# Patient Record
(demographics unavailable — no encounter records)

---

## 2024-11-14 NOTE — IMAGING
[de-identified] : Constitutional: well developed and well nourished, able to communicate Cardiovascular: Peripheral vascular exam is grossly normal Neurologic: Alert and oriented, no acute distress. Skin: normal skin with no ulcers, rashes, or lesions Pulmonary: No respiratory distress, breathing comfortably on room air Lymphatics: No obvious lymphadenopathy or lymphedema in areas examined  BILATERAL KNEE EXAM Alignment: varus Effusion: None Atrophy: None                                                  Stable to Varus/valgus stress Posterior Drawer Test: negative Anterior Drawer Test: Negative Knee Extension/Flexion: 0 / 100  Medial/lateral compartments Medial joint line: POS Tenderness Lateral joint line: No Tenderness [place] test: negative  Patellofemoral joint Medial patellar facet: no tenderness Patellar grind: Negative  Tendons: Pes Anserine: No tenderness Gerdys Tubercle/ IT Band: No tenderness Quadriceps Tendon: No Tenderness patellar tendon: no Tenderness Tibial tubercle: not tenderness Calf: no Tenderness  Neurovascular exam Muscle strength: 5/5 Sensation to light touch: intact Distal pulses: 2+  IMAGING: 10/03/2024 Xrays of the Right Knee were taken demonstrating tricompartmental arthritis with joint space collapse, osteophytes, and sclerosis (NW)

## 2024-11-14 NOTE — WORK
[Sprain/Strain] : sprain/strain [Was the competent medical cause of the injury] : was the competent medical cause of the injury [Total (100%)] : total (100%) [Can return to work without limitations on ______] : can return to work without limitations on [unfilled]

## 2024-11-14 NOTE — HISTORY OF PRESENT ILLNESS
[8] : 8 [5] : 5 [Dull/Aching] : dull/aching [Constant] : constant [de-identified] : 10/3/24 pt is here for RUPA knee pain, referred by , pt uses walker for support, pt had a fall 8/2/24 on her RT knee. 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 Had prior arthroscopic 10-15 years.  she had pain before the injury in the knee. Had prior CSI of the right knee 7-8 years ago. Otherwise has been functioning well with a cane prior the injury. + motrin before and after the injury.   11/14/24 pt is here for wc follow up on rupa knees, says she is doing much better and has no pain but still uses cane [] : no

## 2024-11-14 NOTE — ASSESSMENT
[FreeTextEntry1] : 56 year F with bilateral knee OA CSI of bilateral knees today consider gel vs TKA in the future.  11/14/2024 doing well from CSI  ok to RTW w/o restrictions